# Patient Record
Sex: FEMALE | NOT HISPANIC OR LATINO | URBAN - NONMETROPOLITAN AREA
[De-identification: names, ages, dates, MRNs, and addresses within clinical notes are randomized per-mention and may not be internally consistent; named-entity substitution may affect disease eponyms.]

---

## 2019-01-02 ENCOUNTER — IMPORTED ENCOUNTER (OUTPATIENT)
Dept: URBAN - NONMETROPOLITAN AREA CLINIC 1 | Facility: CLINIC | Age: 29
End: 2019-01-02

## 2019-01-02 PROBLEM — H52.13: Noted: 2019-01-02

## 2019-01-02 PROCEDURE — 92004 COMPRE OPH EXAM NEW PT 1/>: CPT

## 2019-01-02 PROCEDURE — 92310 CONTACT LENS FITTING OU: CPT

## 2019-01-02 PROCEDURE — 92015 DETERMINE REFRACTIVE STATE: CPT

## 2019-01-02 NOTE — PATIENT DISCUSSION
Myopia OUDiscussed refractive status in detail with patient. New glasses and contact Rx given today. Discussed proper care replacement and hygien. Continue to monitor.

## 2022-04-15 ASSESSMENT — VISUAL ACUITY
OS_SC: 20/20
OD_SC: 20/20

## 2022-04-15 ASSESSMENT — KERATOMETRY
OD_K1POWER_DIOPTERS: 42.75
OS_K2POWER_DIOPTERS: 43.75
OS_K1POWER_DIOPTERS: 42.75
OD_AXISANGLE_DEGREES: 170
OD_K2POWER_DIOPTERS: 43.50
OS_AXISANGLE_DEGREES: 012